# Patient Record
Sex: FEMALE | Race: BLACK OR AFRICAN AMERICAN | NOT HISPANIC OR LATINO
[De-identification: names, ages, dates, MRNs, and addresses within clinical notes are randomized per-mention and may not be internally consistent; named-entity substitution may affect disease eponyms.]

---

## 2022-06-15 ENCOUNTER — APPOINTMENT (OUTPATIENT)
Dept: ORTHOPEDIC SURGERY | Facility: CLINIC | Age: 57
End: 2022-06-15

## 2022-09-13 ENCOUNTER — APPOINTMENT (OUTPATIENT)
Dept: ORTHOPEDIC SURGERY | Facility: CLINIC | Age: 57
End: 2022-09-13
Payer: COMMERCIAL

## 2022-09-13 VITALS — BODY MASS INDEX: 37.28 KG/M2 | WEIGHT: 200 LBS | HEIGHT: 61.5 IN

## 2022-09-13 DIAGNOSIS — I10 ESSENTIAL (PRIMARY) HYPERTENSION: ICD-10-CM

## 2022-09-13 PROBLEM — Z00.00 ENCOUNTER FOR PREVENTIVE HEALTH EXAMINATION: Status: ACTIVE | Noted: 2022-09-13

## 2022-09-13 PROCEDURE — 99203 OFFICE O/P NEW LOW 30 MIN: CPT | Mod: 25

## 2022-09-13 PROCEDURE — J3490M: CUSTOM

## 2022-09-13 PROCEDURE — 20526 THER INJECTION CARP TUNNEL: CPT | Mod: 50

## 2022-09-13 PROCEDURE — 73110 X-RAY EXAM OF WRIST: CPT | Mod: 50

## 2022-09-13 PROCEDURE — 76942 ECHO GUIDE FOR BIOPSY: CPT

## 2022-09-13 NOTE — HISTORY OF PRESENT ILLNESS
[Gradual] : gradual [8] : 8 [Tingling] : tingling [Constant] : constant [Nothing helps with pain getting better] : Nothing helps with pain getting better [Full time] : Work status: full time [de-identified] : new evaluation for bilateral hand numbness [] : no [FreeTextEntry1] : bilateral hand numbness [FreeTextEntry5] : 9/13 numbness b hands ronaldo at night. emg: b cts. [FreeTextEntry6] : numbness pins and needles [de-identified] : lifting  [de-identified] : emg/ncv

## 2022-09-13 NOTE — PHYSICAL EXAM
[] : positive Phalen's [Bilateral] : hand bilaterally [No acute displaced fracture or dislocation] : No acute displaced fracture or dislocation

## 2022-10-11 ENCOUNTER — APPOINTMENT (OUTPATIENT)
Dept: ORTHOPEDIC SURGERY | Facility: CLINIC | Age: 57
End: 2022-10-11

## 2022-10-11 VITALS — BODY MASS INDEX: 37.28 KG/M2 | WEIGHT: 200 LBS | HEIGHT: 61.5 IN

## 2022-10-11 PROCEDURE — 99215 OFFICE O/P EST HI 40 MIN: CPT | Mod: 57

## 2022-10-11 NOTE — HISTORY OF PRESENT ILLNESS
[8] : 8 [Tingling] : tingling [de-identified] : new evaluation for bilateral hand numbness [Gradual] : gradual [Constant] : constant [Nothing helps with pain getting better] : Nothing helps with pain getting better [] : no [Full time] : Work status: full time [FreeTextEntry1] : bilateral hand numbness [FreeTextEntry5] : 9/13 numbness b hands ronaldo at night. emg: b cts.\par 10/11 continued numbness [FreeTextEntry6] : numbness pins and needles [de-identified] : lifting  [de-identified] : emg/ncv [de-identified] : none

## 2022-11-03 ENCOUNTER — APPOINTMENT (OUTPATIENT)
Age: 57
End: 2022-11-03

## 2022-11-11 ENCOUNTER — APPOINTMENT (OUTPATIENT)
Dept: ORTHOPEDIC SURGERY | Facility: CLINIC | Age: 57
End: 2022-11-11

## 2023-03-21 ENCOUNTER — APPOINTMENT (OUTPATIENT)
Dept: ORTHOPEDIC SURGERY | Facility: CLINIC | Age: 58
End: 2023-03-21
Payer: COMMERCIAL

## 2023-03-21 VITALS — WEIGHT: 200 LBS | HEIGHT: 61.5 IN | BODY MASS INDEX: 37.28 KG/M2

## 2023-03-21 DIAGNOSIS — G56.03 CARPAL TUNNEL SYNDROM,BILATERAL UPPER LIMBS: ICD-10-CM

## 2023-03-21 PROCEDURE — 76942 ECHO GUIDE FOR BIOPSY: CPT

## 2023-03-21 PROCEDURE — 20526 THER INJECTION CARP TUNNEL: CPT | Mod: 50

## 2023-03-21 PROCEDURE — 99215 OFFICE O/P EST HI 40 MIN: CPT | Mod: 57,25

## 2023-03-21 NOTE — HISTORY OF PRESENT ILLNESS
[Gradual] : gradual [8] : 8 [Tingling] : tingling [Constant] : constant [Nothing helps with pain getting better] : Nothing helps with pain getting better [Full time] : Work status: full time [de-identified] : new evaluation for bilateral hand numbness [] : no [FreeTextEntry1] : bilateral hand numbness [FreeTextEntry5] : 9/13 numbness b hands ronaldo at night. emg: b cts.\par 10/11 continued numbness\par 3/21 continued numbness [FreeTextEntry6] : numbness pins and needles [de-identified] : lifting  [de-identified] : emg/ncv [de-identified] : none

## 2023-08-01 ENCOUNTER — APPOINTMENT (OUTPATIENT)
Dept: ORTHOPEDIC SURGERY | Facility: CLINIC | Age: 58
End: 2023-08-01
Payer: COMMERCIAL

## 2023-08-01 VITALS — BODY MASS INDEX: 37.38 KG/M2 | HEIGHT: 61 IN | WEIGHT: 198 LBS

## 2023-08-01 DIAGNOSIS — M25.561 PAIN IN RIGHT KNEE: ICD-10-CM

## 2023-08-01 PROCEDURE — 99214 OFFICE O/P EST MOD 30 MIN: CPT | Mod: 25

## 2023-08-01 PROCEDURE — 20611 DRAIN/INJ JOINT/BURSA W/US: CPT | Mod: RT

## 2023-08-01 PROCEDURE — J3490M: CUSTOM

## 2023-08-01 PROCEDURE — 73562 X-RAY EXAM OF KNEE 3: CPT | Mod: RT

## 2023-08-01 RX ORDER — AMLODIPINE BESYLATE 10 MG/1
10 TABLET ORAL
Refills: 0 | Status: ACTIVE | COMMUNITY

## 2023-08-01 RX ORDER — METOPROLOL SUCCINATE 100 MG/1
100 TABLET, EXTENDED RELEASE ORAL
Refills: 0 | Status: ACTIVE | COMMUNITY

## 2023-08-08 NOTE — HISTORY OF PRESENT ILLNESS
[9] : 9 [de-identified] : 8/1/23: RT knee pain x6 weeks. No specific injury/trauma. Most pain is medial/posterior. Pt reports pain when first getting up in the morning. Pain worsens with walking and stairs.  Pt went to  and was told she had a Bakers cyst. Pt given Naproxyn 500 mg for pain with good relief but still some medial pain.   [] : no [FreeTextEntry1] : RT Knee [FreeTextEntry5] : Opal is a 58-year F, Knee is swollen. Went to UC in June stated it's a baker's cyst.

## 2023-08-08 NOTE — IMAGING
[Right] : right knee [There are no fractures, subluxations or dislocations. No significant abnormalities are seen] : There are no fractures, subluxations or dislocations. No significant abnormalities are seen [de-identified] : RIGHT KNEE mod effusion baker's cyst medial joint line tenderness +1 edema +2 pulses NVI  XR show mild arthritic changes mostly patellofemoral.  [FreeTextEntry9] : mild arthritic changes mostly patellofemoral.

## 2023-08-08 NOTE — PROCEDURE
[FreeTextEntry3] : Large joint injection was performed on the _RIGHT_ knee. The indication for this procedure was pain, inflammation, and x-ray evidence of Osteoarthritis on this or prior visit. The site was prepped with betadine, ethyl chloride sprayed topically, and sterile technique used. An injection of Lidocaine 3cc of 1% , Bupivacaine (Marcaine) 5cc of 0.25% , Methylprednisolone (Depomedrol) cc of 80 mg was used. Patient was advised to call if redness, pain, or fever occur, apply ice for 15 minutes out of every hour for the next 12-24 hours as tolerated and patient was advised to rest the joint(s) for days. Patient has tried OTC's including aspirin, Ibuprofen, Aleve, etc or prescription NSAIDS, and/or exercises at home and/or physical therapy without satisfactory response and patient had decreased mobility in the joint. Ultrasound guidance was indicated for this patient due to better visualize joint space. All ultrasound images have been permanently captured and stored accordingly in our picture.    Aspiration of the RIGHT knee was performed using an 18-gauge needle under sterile conditions ------------cc of ---------------fluid was aspirated (this was sent for cell count, culture, gram stain, and crystals)

## 2023-08-08 NOTE — ASSESSMENT
[FreeTextEntry1] : 8/1/23:  Effusion and medial sided pain mild arthritis most likely meniscus tear. Failed antiinflam tx. Discussed conservative vs surgical tx options- risk ad benefits explained. Will give csi inj and aspiration today in RT knee. Pt will begin PT. Will obtain MRI to eval meniscus tear if no improvement. F/up 1 month

## 2023-09-05 ENCOUNTER — APPOINTMENT (OUTPATIENT)
Dept: ORTHOPEDIC SURGERY | Facility: CLINIC | Age: 58
End: 2023-09-05

## 2023-10-20 ENCOUNTER — APPOINTMENT (OUTPATIENT)
Dept: ORTHOPEDIC SURGERY | Facility: CLINIC | Age: 58
End: 2023-10-20
Payer: COMMERCIAL

## 2023-10-20 VITALS — BODY MASS INDEX: 37.38 KG/M2 | WEIGHT: 198 LBS | HEIGHT: 61 IN

## 2023-10-20 PROCEDURE — 99214 OFFICE O/P EST MOD 30 MIN: CPT

## 2023-10-20 RX ORDER — NAPROXEN 500 MG/1
500 TABLET, DELAYED RELEASE ORAL TWICE DAILY
Qty: 60 | Refills: 0 | Status: ACTIVE | COMMUNITY
Start: 1900-01-01 | End: 1900-01-01

## 2023-10-27 ENCOUNTER — APPOINTMENT (OUTPATIENT)
Dept: MRI IMAGING | Facility: CLINIC | Age: 58
End: 2023-10-27
Payer: COMMERCIAL

## 2023-10-27 PROCEDURE — 73721 MRI JNT OF LWR EXTRE W/O DYE: CPT | Mod: RT

## 2023-10-31 ENCOUNTER — APPOINTMENT (OUTPATIENT)
Dept: ORTHOPEDIC SURGERY | Facility: CLINIC | Age: 58
End: 2023-10-31
Payer: COMMERCIAL

## 2023-10-31 VITALS — HEIGHT: 61 IN | BODY MASS INDEX: 37.38 KG/M2 | WEIGHT: 198 LBS

## 2023-10-31 DIAGNOSIS — S83.231A COMPLEX TEAR OF MEDIAL MENISCUS, CURRENT INJURY, RIGHT KNEE, INITIAL ENCOUNTER: ICD-10-CM

## 2023-10-31 DIAGNOSIS — M17.11 UNILATERAL PRIMARY OSTEOARTHRITIS, RIGHT KNEE: ICD-10-CM

## 2023-10-31 PROCEDURE — 99213 OFFICE O/P EST LOW 20 MIN: CPT
